# Patient Record
Sex: MALE | Race: WHITE | HISPANIC OR LATINO | Employment: FULL TIME | URBAN - METROPOLITAN AREA
[De-identification: names, ages, dates, MRNs, and addresses within clinical notes are randomized per-mention and may not be internally consistent; named-entity substitution may affect disease eponyms.]

---

## 2020-05-09 PROBLEM — G93.89 BRAIN MASS: Status: ACTIVE | Noted: 2020-05-09

## 2020-05-10 PROBLEM — Z01.818 PREOPERATIVE CLEARANCE: Status: ACTIVE | Noted: 2020-05-10

## 2020-05-10 PROBLEM — Z95.1 HX OF CABG: Status: ACTIVE | Noted: 2020-05-10

## 2020-05-10 PROBLEM — I25.2 HISTORY OF MI (MYOCARDIAL INFARCTION): Status: ACTIVE | Noted: 2020-05-10

## 2020-05-11 PROBLEM — C71.9 GLIOMA: Status: ACTIVE | Noted: 2020-05-09

## 2020-05-27 ENCOUNTER — NURSE TRIAGE (OUTPATIENT)
Dept: ADMINISTRATIVE | Facility: CLINIC | Age: 60
End: 2020-05-27

## 2020-05-27 NOTE — TELEPHONE ENCOUNTER
Called patient on behalf of [facility]. Pt denied developing any fever, cough or shortness of breath since the procedure.    Reason for Disposition   Health Information question, no triage required and triager able to answer question    Protocols used: INFORMATION ONLY CALL-A-AH

## 2020-07-07 PROBLEM — K59.00 CONSTIPATION: Status: ACTIVE | Noted: 2020-07-07

## 2020-07-11 PROBLEM — Z51.11 ENCOUNTER FOR ANTINEOPLASTIC CHEMOTHERAPY: Status: ACTIVE | Noted: 2020-07-11

## 2020-08-22 PROBLEM — R53.1 LEFT-SIDED WEAKNESS: Status: ACTIVE | Noted: 2020-01-01

## 2020-08-24 PROBLEM — G93.6 CEREBRAL EDEMA: Status: ACTIVE | Noted: 2020-01-01

## 2020-10-07 PROBLEM — K59.00 CONSTIPATION: Status: RESOLVED | Noted: 2020-07-07 | Resolved: 2020-01-01

## 2020-10-07 PROBLEM — L59.8 RADIONECROSIS: Status: ACTIVE | Noted: 2020-01-01
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
## 2020-10-07 PROBLEM — Z01.818 PREOPERATIVE CLEARANCE: Status: RESOLVED | Noted: 2020-05-10 | Resolved: 2020-01-01

## 2021-02-07 PROBLEM — R90.89 MIDLINE SHIFT OF BRAIN: Status: ACTIVE | Noted: 2021-01-01

## 2021-03-26 PROBLEM — L08.9 WOUND INFECTION: Status: ACTIVE | Noted: 2021-01-01

## 2021-03-26 PROBLEM — T14.8XXA WOUND INFECTION: Status: ACTIVE | Noted: 2021-01-01

## 2021-04-09 PROBLEM — R56.9 SEIZURES: Status: ACTIVE | Noted: 2021-01-01

## 2021-04-10 PROBLEM — E11.9 DIABETES: Status: ACTIVE | Noted: 2021-01-01

## 2021-04-10 PROBLEM — R73.9 HYPERGLYCEMIA: Status: ACTIVE | Noted: 2021-01-01

## 2021-07-20 PROBLEM — G81.94 LEFT HEMIPARESIS: Status: ACTIVE | Noted: 2020-01-01

## 2021-07-25 PROBLEM — J69.0: Status: ACTIVE | Noted: 2021-01-01

## 2021-07-25 PROBLEM — L89.109: Status: ACTIVE | Noted: 2021-01-01

## 2021-07-28 PROBLEM — I10 HTN (HYPERTENSION): Status: ACTIVE | Noted: 2021-01-01
